# Patient Record
Sex: MALE | Race: WHITE | NOT HISPANIC OR LATINO
[De-identification: names, ages, dates, MRNs, and addresses within clinical notes are randomized per-mention and may not be internally consistent; named-entity substitution may affect disease eponyms.]

---

## 2017-05-08 PROBLEM — Z00.00 ENCOUNTER FOR PREVENTIVE HEALTH EXAMINATION: Status: ACTIVE | Noted: 2017-05-08

## 2017-06-06 ENCOUNTER — APPOINTMENT (OUTPATIENT)
Dept: INTERNAL MEDICINE | Facility: CLINIC | Age: 57
End: 2017-06-06

## 2017-06-06 ENCOUNTER — NON-APPOINTMENT (OUTPATIENT)
Age: 57
End: 2017-06-06

## 2017-06-06 VITALS
OXYGEN SATURATION: 97 % | BODY MASS INDEX: 36.54 KG/M2 | RESPIRATION RATE: 16 BRPM | HEART RATE: 66 BPM | TEMPERATURE: 98.2 F | HEIGHT: 71 IN | WEIGHT: 261.01 LBS | DIASTOLIC BLOOD PRESSURE: 66 MMHG | SYSTOLIC BLOOD PRESSURE: 108 MMHG

## 2017-06-06 RX ORDER — SERTRALINE HYDROCHLORIDE 100 MG/1
100 TABLET, FILM COATED ORAL
Qty: 180 | Refills: 0 | Status: ACTIVE | COMMUNITY
Start: 2017-01-06

## 2017-06-06 RX ORDER — TESTOSTERONE 30 MG/1.5ML
30 SOLUTION TOPICAL
Qty: 540 | Refills: 0 | Status: ACTIVE | COMMUNITY
Start: 2016-12-26

## 2017-06-06 RX ORDER — BUSPIRONE HYDROCHLORIDE 15 MG/1
15 TABLET ORAL
Qty: 270 | Refills: 0 | Status: ACTIVE | COMMUNITY
Start: 2017-03-08

## 2017-06-06 RX ORDER — CLONAZEPAM 2 MG/1
2 TABLET ORAL
Qty: 120 | Refills: 0 | Status: ACTIVE | COMMUNITY
Start: 2016-12-29

## 2017-06-06 RX ORDER — OMEGA-3-ACID ETHYL ESTERS CAPSULES 1 G/1
1 CAPSULE, LIQUID FILLED ORAL
Qty: 360 | Refills: 0 | Status: ACTIVE | COMMUNITY
Start: 2017-03-08

## 2017-11-22 ENCOUNTER — RX RENEWAL (OUTPATIENT)
Age: 57
End: 2017-11-22

## 2017-12-06 ENCOUNTER — APPOINTMENT (OUTPATIENT)
Dept: INTERNAL MEDICINE | Facility: CLINIC | Age: 57
End: 2017-12-06

## 2017-12-07 ENCOUNTER — APPOINTMENT (OUTPATIENT)
Dept: INTERNAL MEDICINE | Facility: CLINIC | Age: 57
End: 2017-12-07
Payer: COMMERCIAL

## 2017-12-07 VITALS
OXYGEN SATURATION: 98 % | TEMPERATURE: 97.6 F | SYSTOLIC BLOOD PRESSURE: 110 MMHG | HEART RATE: 66 BPM | BODY MASS INDEX: 36.68 KG/M2 | RESPIRATION RATE: 16 BRPM | DIASTOLIC BLOOD PRESSURE: 64 MMHG | HEIGHT: 71 IN | WEIGHT: 262 LBS

## 2017-12-07 PROCEDURE — 94060 EVALUATION OF WHEEZING: CPT

## 2017-12-07 PROCEDURE — 94727 GAS DIL/WSHOT DETER LNG VOL: CPT

## 2017-12-07 PROCEDURE — 94729 DIFFUSING CAPACITY: CPT

## 2017-12-07 PROCEDURE — 99214 OFFICE O/P EST MOD 30 MIN: CPT | Mod: 25

## 2017-12-07 RX ORDER — BLOOD SUGAR DIAGNOSTIC
STRIP MISCELLANEOUS
Qty: 100 | Refills: 0 | Status: ACTIVE | COMMUNITY
Start: 2017-11-15

## 2017-12-07 RX ORDER — LANCETS 33 GAUGE
EACH MISCELLANEOUS
Qty: 100 | Refills: 0 | Status: ACTIVE | COMMUNITY
Start: 2017-11-15

## 2017-12-07 RX ORDER — BLOOD-GLUCOSE METER
W/DEVICE KIT MISCELLANEOUS
Qty: 1 | Refills: 0 | Status: ACTIVE | COMMUNITY
Start: 2017-11-15

## 2017-12-26 ENCOUNTER — RX RENEWAL (OUTPATIENT)
Age: 57
End: 2017-12-26

## 2018-01-22 ENCOUNTER — RX RENEWAL (OUTPATIENT)
Age: 58
End: 2018-01-22

## 2018-03-30 ENCOUNTER — RX RENEWAL (OUTPATIENT)
Age: 58
End: 2018-03-30

## 2018-04-24 ENCOUNTER — MEDICATION RENEWAL (OUTPATIENT)
Age: 58
End: 2018-04-24

## 2018-06-27 ENCOUNTER — APPOINTMENT (OUTPATIENT)
Dept: INTERNAL MEDICINE | Facility: CLINIC | Age: 58
End: 2018-06-27
Payer: COMMERCIAL

## 2018-06-27 VITALS
RESPIRATION RATE: 20 BRPM | OXYGEN SATURATION: 99 % | HEART RATE: 60 BPM | WEIGHT: 263 LBS | DIASTOLIC BLOOD PRESSURE: 68 MMHG | HEIGHT: 71 IN | TEMPERATURE: 97.4 F | SYSTOLIC BLOOD PRESSURE: 128 MMHG | BODY MASS INDEX: 36.82 KG/M2

## 2018-06-27 PROCEDURE — 99214 OFFICE O/P EST MOD 30 MIN: CPT | Mod: 25

## 2018-06-27 PROCEDURE — 94060 EVALUATION OF WHEEZING: CPT

## 2018-09-06 ENCOUNTER — MEDICATION RENEWAL (OUTPATIENT)
Age: 58
End: 2018-09-06

## 2019-01-22 ENCOUNTER — RESULT REVIEW (OUTPATIENT)
Age: 59
End: 2019-01-22

## 2019-02-06 ENCOUNTER — APPOINTMENT (OUTPATIENT)
Dept: INTERNAL MEDICINE | Facility: CLINIC | Age: 59
End: 2019-02-06
Payer: COMMERCIAL

## 2019-02-06 VITALS
BODY MASS INDEX: 36.96 KG/M2 | DIASTOLIC BLOOD PRESSURE: 82 MMHG | WEIGHT: 264 LBS | TEMPERATURE: 98.1 F | OXYGEN SATURATION: 97 % | SYSTOLIC BLOOD PRESSURE: 124 MMHG | HEART RATE: 72 BPM | RESPIRATION RATE: 18 BRPM | HEIGHT: 71 IN

## 2019-02-06 DIAGNOSIS — Z82.3 FAMILY HISTORY OF STROKE: ICD-10-CM

## 2019-02-06 DIAGNOSIS — Z82.0 FAMILY HISTORY OF EPILEPSY AND OTHER DISEASES OF THE NERVOUS SYSTEM: ICD-10-CM

## 2019-02-06 DIAGNOSIS — Z80.1 FAMILY HISTORY OF MALIGNANT NEOPLASM OF TRACHEA, BRONCHUS AND LUNG: ICD-10-CM

## 2019-02-06 DIAGNOSIS — Z72.3 LACK OF PHYSICAL EXERCISE: ICD-10-CM

## 2019-02-06 PROCEDURE — 94729 DIFFUSING CAPACITY: CPT

## 2019-02-06 PROCEDURE — ZZZZZ: CPT

## 2019-02-06 PROCEDURE — 99214 OFFICE O/P EST MOD 30 MIN: CPT | Mod: 25

## 2019-02-06 PROCEDURE — 94727 GAS DIL/WSHOT DETER LNG VOL: CPT

## 2019-02-06 PROCEDURE — 94060 EVALUATION OF WHEEZING: CPT

## 2019-02-06 RX ORDER — NAPROXEN 500 MG/1
500 TABLET ORAL
Qty: 60 | Refills: 0 | Status: DISCONTINUED | COMMUNITY
Start: 2017-06-24 | End: 2019-02-06

## 2019-02-06 RX ORDER — PRASUGREL HYDROCHLORIDE 10 MG/1
10 TABLET, COATED ORAL
Qty: 90 | Refills: 0 | Status: DISCONTINUED | COMMUNITY
Start: 2016-12-21 | End: 2019-02-06

## 2019-02-06 RX ORDER — FLUTICASONE PROPIONATE 50 UG/1
50 SPRAY, METERED NASAL
Qty: 16 | Refills: 0 | Status: DISCONTINUED | COMMUNITY
Start: 2016-12-29 | End: 2019-02-06

## 2019-02-06 NOTE — PHYSICAL EXAM
[General Appearance - Well Developed] : well developed [Normal Appearance] : normal appearance [Well Groomed] : well groomed [General Appearance - Well Nourished] : well nourished [No Deformities] : no deformities [General Appearance - In No Acute Distress] : no acute distress [Normal Conjunctiva] : the conjunctiva exhibited no abnormalities [Eyelids - No Xanthelasma] : the eyelids demonstrated no xanthelasmas [Normal Oropharynx] : normal oropharynx [Neck Appearance] : the appearance of the neck was normal [Neck Cervical Mass (___cm)] : no neck mass was observed [Jugular Venous Distention Increased] : there was no jugular-venous distention [Thyroid Diffuse Enlargement] : the thyroid was not enlarged [Thyroid Nodule] : there were no palpable thyroid nodules [Heart Rate And Rhythm] : heart rate and rhythm were normal [Heart Sounds] : normal S1 and S2 [Murmurs] : no murmurs present [Respiration, Rhythm And Depth] : normal respiratory rhythm and effort [Exaggerated Use Of Accessory Muscles For Inspiration] : no accessory muscle use [Auscultation Breath Sounds / Voice Sounds] : lungs were clear to auscultation bilaterally [Abnormal Walk] : normal gait [Gait - Sufficient For Exercise Testing] : the gait was sufficient for exercise testing [Nail Clubbing] : no clubbing of the fingernails [Cyanosis, Localized] : no localized cyanosis [Petechial Hemorrhages (___cm)] : no petechial hemorrhages [Skin Color & Pigmentation] : normal skin color and pigmentation [] : no rash [No Venous Stasis] : no venous stasis [Skin Lesions] : no skin lesions [No Skin Ulcers] : no skin ulcer [No Xanthoma] : no  xanthoma was observed [Deep Tendon Reflexes (DTR)] : deep tendon reflexes were 2+ and symmetric [Sensation] : the sensory exam was normal to light touch and pinprick [No Focal Deficits] : no focal deficits [Oriented To Time, Place, And Person] : oriented to person, place, and time [Impaired Insight] : insight and judgment were intact [Affect] : the affect was normal

## 2019-02-06 NOTE — ASSESSMENT
[FreeTextEntry1] : Len presents for a followup evaluation. Patient has a history of obstructive sleep apnea and narcolepsy. He will continue on current CPAP as well as Provigil. He does not require metered-dose inhaler therapy. We'll followup in 6 months.

## 2019-02-06 NOTE — HISTORY OF PRESENT ILLNESS
[FreeTextEntry1] : Len presents for a followup evaluation. He is feeling well. He continues on CPAP. Patient has a history of obstructive sleep apnea as well as narcolepsy. He continues on Provigil 300 mg daily. Imdur does get some shortness of breath with exertion.

## 2019-03-04 ENCOUNTER — RESULT REVIEW (OUTPATIENT)
Age: 59
End: 2019-03-04

## 2019-07-31 ENCOUNTER — RX RENEWAL (OUTPATIENT)
Age: 59
End: 2019-07-31

## 2019-08-02 ENCOUNTER — NON-APPOINTMENT (OUTPATIENT)
Age: 59
End: 2019-08-02

## 2019-08-02 ENCOUNTER — TRANSCRIPTION ENCOUNTER (OUTPATIENT)
Age: 59
End: 2019-08-02

## 2019-08-02 ENCOUNTER — APPOINTMENT (OUTPATIENT)
Dept: INTERNAL MEDICINE | Facility: CLINIC | Age: 59
End: 2019-08-02
Payer: COMMERCIAL

## 2019-08-02 VITALS
HEIGHT: 71 IN | DIASTOLIC BLOOD PRESSURE: 66 MMHG | HEART RATE: 74 BPM | TEMPERATURE: 98.3 F | SYSTOLIC BLOOD PRESSURE: 134 MMHG | WEIGHT: 248 LBS | OXYGEN SATURATION: 98 % | BODY MASS INDEX: 34.72 KG/M2 | RESPIRATION RATE: 16 BRPM

## 2019-08-02 PROCEDURE — 99214 OFFICE O/P EST MOD 30 MIN: CPT | Mod: 25

## 2019-08-02 PROCEDURE — 94060 EVALUATION OF WHEEZING: CPT

## 2019-08-02 NOTE — DISCUSSION/SUMMARY
[FreeTextEntry1] : Len presents for followup evaluation. He will continue on his CPAP as well as Provigil for narcolepsy. He denies any significant shortness of breath. Medications have been reviewed. He will followup in 6 months.

## 2019-08-02 NOTE — HISTORY OF PRESENT ILLNESS
[FreeTextEntry1] : Mr. Cano presents for followup evaluation. He has a history of obstructive sleep apnea as well as narcolepsy. He denies any chest pain, shortness of breath palpitations. No nocturnal symptoms of cough or dyspnea. He continues on Provigil therapy as well as his CPAP.

## 2019-08-02 NOTE — PHYSICAL EXAM
[General Appearance - Well Developed] : well developed [Normal Appearance] : normal appearance [General Appearance - Well Nourished] : well nourished [Well Groomed] : well groomed [No Deformities] : no deformities [General Appearance - In No Acute Distress] : no acute distress [Normal Conjunctiva] : the conjunctiva exhibited no abnormalities [Eyelids - No Xanthelasma] : the eyelids demonstrated no xanthelasmas [Normal Oropharynx] : normal oropharynx [Neck Appearance] : the appearance of the neck was normal [Jugular Venous Distention Increased] : there was no jugular-venous distention [Neck Cervical Mass (___cm)] : no neck mass was observed [Thyroid Diffuse Enlargement] : the thyroid was not enlarged [Thyroid Nodule] : there were no palpable thyroid nodules [Heart Sounds] : normal S1 and S2 [Heart Rate And Rhythm] : heart rate and rhythm were normal [Murmurs] : no murmurs present [Respiration, Rhythm And Depth] : normal respiratory rhythm and effort [Exaggerated Use Of Accessory Muscles For Inspiration] : no accessory muscle use [Auscultation Breath Sounds / Voice Sounds] : lungs were clear to auscultation bilaterally [Abdomen Soft] : soft [Abdomen Tenderness] : non-tender [Abdomen Mass (___ Cm)] : no abdominal mass palpated [Abnormal Walk] : normal gait [Gait - Sufficient For Exercise Testing] : the gait was sufficient for exercise testing [Nail Clubbing] : no clubbing of the fingernails [Petechial Hemorrhages (___cm)] : no petechial hemorrhages [Cyanosis, Localized] : no localized cyanosis [Skin Color & Pigmentation] : normal skin color and pigmentation [] : no rash [No Venous Stasis] : no venous stasis [Skin Lesions] : no skin lesions [No Xanthoma] : no  xanthoma was observed [No Skin Ulcers] : no skin ulcer [Deep Tendon Reflexes (DTR)] : deep tendon reflexes were 2+ and symmetric [Sensation] : the sensory exam was normal to light touch and pinprick [No Focal Deficits] : no focal deficits

## 2019-11-20 ENCOUNTER — APPOINTMENT (OUTPATIENT)
Dept: GASTROENTEROLOGY | Facility: CLINIC | Age: 59
End: 2019-11-20
Payer: COMMERCIAL

## 2019-11-20 VITALS
BODY MASS INDEX: 35.14 KG/M2 | HEART RATE: 73 BPM | HEIGHT: 71 IN | WEIGHT: 251 LBS | DIASTOLIC BLOOD PRESSURE: 67 MMHG | SYSTOLIC BLOOD PRESSURE: 138 MMHG

## 2019-11-20 PROCEDURE — 99213 OFFICE O/P EST LOW 20 MIN: CPT

## 2019-11-20 NOTE — PHYSICAL EXAM
[General Appearance - Alert] : alert [General Appearance - In No Acute Distress] : in no acute distress [Sclera] : the sclera and conjunctiva were normal [PERRL With Normal Accommodation] : pupils were equal in size, round, and reactive to light [Extraocular Movements] : extraocular movements were intact [] : no respiratory distress [Auscultation Breath Sounds / Voice Sounds] : lungs were clear to auscultation bilaterally [Heart Rate And Rhythm] : heart rate was normal and rhythm regular [Heart Sounds] : normal S1 and S2 [Heart Sounds Gallop] : no gallops [Murmurs] : no murmurs [Heart Sounds Pericardial Friction Rub] : no pericardial rub [FreeTextEntry1] : + mild epigastric tenderness.  [Abnormal Walk] : normal gait [Nail Clubbing] : no clubbing  or cyanosis of the fingernails [Musculoskeletal - Swelling] : no joint swelling seen [Motor Tone] : muscle strength and tone were normal [Oriented To Time, Place, And Person] : oriented to person, place, and time [Impaired Insight] : insight and judgment were intact [Affect] : the affect was normal

## 2019-11-20 NOTE — HISTORY OF PRESENT ILLNESS
[de-identified] : 60 y/o male here today with hx of gerd/duodenitis on egd.  He is currently on dexilant and zantac at bedtime and continues to have mild breakthrough symptoms.  Pt reports not wanting to stop zantac but discussed risks of possible cancer? will trial pepcid as needed.  He otherwise feels well.  Denies any abdominal pain just mild tenderness after his trigger foods.  Denies any n/v, rectal bleeding, or melena.

## 2019-11-20 NOTE — ASSESSMENT
[FreeTextEntry1] : 58 y/o male with hx of gerd/duodenitis on dexilant and zantac with mild breakthrough symptoms.  Instructed to stop zantac now due to risks of cancer and will start pepcid.  Discussed the importance of medication adherence and diet in length.  Discussed weight loss.  If symptoms worsen, will repeat EGD.  Discussed with Dr. Farooq.

## 2020-02-04 ENCOUNTER — APPOINTMENT (OUTPATIENT)
Dept: INTERNAL MEDICINE | Facility: CLINIC | Age: 60
End: 2020-02-04
Payer: COMMERCIAL

## 2020-02-04 VITALS
WEIGHT: 256 LBS | RESPIRATION RATE: 18 BRPM | DIASTOLIC BLOOD PRESSURE: 78 MMHG | OXYGEN SATURATION: 96 % | HEART RATE: 72 BPM | BODY MASS INDEX: 35.84 KG/M2 | SYSTOLIC BLOOD PRESSURE: 136 MMHG | TEMPERATURE: 97.8 F | HEIGHT: 71 IN

## 2020-02-04 PROCEDURE — 99214 OFFICE O/P EST MOD 30 MIN: CPT

## 2020-02-04 NOTE — ASSESSMENT
[FreeTextEntry1] : Mr. Cano presents for followup evaluation. Patient will be sent for a repeat CPAP titration study. He is having increased symptoms of daytime tiredness and is been several years since his last CPAP titration. He will continue on modafinil 100 mg t.i.d. Followup in 6 months.

## 2020-02-04 NOTE — PHYSICAL EXAM
[No Acute Distress] : no acute distress [Normal Oropharynx] : normal oropharynx [Normal Appearance] : normal appearance [No Neck Mass] : no neck mass [Normal Rate/Rhythm] : normal rate/rhythm [Normal S1, S2] : normal s1, s2 [No Resp Distress] : no resp distress [No Murmurs] : no murmurs [Clear to Auscultation Bilaterally] : clear to auscultation bilaterally [No Abnormalities] : no abnormalities [Benign] : benign [Normal Gait] : normal gait [No Clubbing] : no clubbing [No Cyanosis] : no cyanosis [No Edema] : no edema [FROM] : FROM [Normal Color/ Pigmentation] : normal color/ pigmentation [No Focal Deficits] : no focal deficits [Oriented x3] : oriented x3 [Normal Affect] : normal affect

## 2020-02-04 NOTE — HISTORY OF PRESENT ILLNESS
[TextBox_4] : Mr. Vasquez presents for a followup evaluation. As a history of obstructive sleep apnea and is currently on BiPAP 18/14 cm of pressure. Patient also has history of narcolepsy and is on Modanifil 100 mg t.i.d. Patient is having issues with a mask leak. He is having some daytime tiredness.

## 2020-02-18 ENCOUNTER — RX RENEWAL (OUTPATIENT)
Age: 60
End: 2020-02-18

## 2020-02-19 ENCOUNTER — APPOINTMENT (OUTPATIENT)
Dept: GASTROENTEROLOGY | Facility: CLINIC | Age: 60
End: 2020-02-19
Payer: COMMERCIAL

## 2020-02-19 VITALS
SYSTOLIC BLOOD PRESSURE: 143 MMHG | DIASTOLIC BLOOD PRESSURE: 78 MMHG | BODY MASS INDEX: 36.26 KG/M2 | HEART RATE: 70 BPM | HEIGHT: 71 IN | WEIGHT: 259 LBS

## 2020-02-19 PROCEDURE — 99213 OFFICE O/P EST LOW 20 MIN: CPT

## 2020-02-19 NOTE — HISTORY OF PRESENT ILLNESS
[de-identified] : Mr. DERRICK IRBY is a 59 year old male with history of atypical reflux symptoms and chest discomfort. Patient has had normal endoscopy and has had a trial of multiple PPIs including Dexilant. There is no true dysphagia. Patient is careful about diet. Issues about possible nonacid reflux and esophageal spasm discussed at length with the patient.\par

## 2020-02-19 NOTE — ASSESSMENT
[FreeTextEntry1] : 60 yo male with history of atypical GERD symptoms without response to PPI and H2 blockers. Will refer patient for EM and 24 hr pH study on medications.

## 2020-03-13 ENCOUNTER — APPOINTMENT (OUTPATIENT)
Dept: INTERNAL MEDICINE | Facility: CLINIC | Age: 60
End: 2020-03-13
Payer: COMMERCIAL

## 2020-03-13 VITALS
RESPIRATION RATE: 18 BRPM | HEART RATE: 85 BPM | TEMPERATURE: 97.8 F | SYSTOLIC BLOOD PRESSURE: 134 MMHG | DIASTOLIC BLOOD PRESSURE: 70 MMHG | BODY MASS INDEX: 37.24 KG/M2 | HEIGHT: 71 IN | WEIGHT: 266 LBS | OXYGEN SATURATION: 97 %

## 2020-03-13 PROCEDURE — 94727 GAS DIL/WSHOT DETER LNG VOL: CPT

## 2020-03-13 PROCEDURE — 94729 DIFFUSING CAPACITY: CPT

## 2020-03-13 PROCEDURE — 99214 OFFICE O/P EST MOD 30 MIN: CPT | Mod: 25

## 2020-03-13 PROCEDURE — ZZZZZ: CPT

## 2020-03-13 PROCEDURE — 94060 EVALUATION OF WHEEZING: CPT

## 2020-03-13 NOTE — HISTORY OF PRESENT ILLNESS
[TextBox_4] : This is a 60-year-old male with a history of obstructive sleep apnea maintained on BIPAP treatment, CAD, GERD, who fell 2 days ago and sustained a ligament injury to the left wrist. He also has noted some right lower medial rib tenderness. He was seen at [facility] and had a CT scan of chest. This incidentally showed a 1.5 cm groundglass finding in the left lower lobe. \par \par The patient does have some mild cough associated with postnasal drip which has not changed recently. He is not having any sputum production, hemoptysis, wheezing, or dyspnea on exertion.  He denies recent fever, chills, night sweats, poor appetite, or unintentional weight loss. He has no history of cancer, lymphoma, or malignancy.

## 2020-03-13 NOTE — PROCEDURE
[FreeTextEntry1] : For pulmonary function testing is within normal limits with an FEV1 of 3.67 or 99% predicted. Diffusing capacity is normal.

## 2020-03-13 NOTE — REVIEW OF SYSTEMS
[Negative] : Endocrine [Fever] : no fever [Chills] : no chills [Poor Appetite] : no poor appetite [Recent Wt Loss (___ Lbs)] : ~T no recent weight loss [TextBox_30] : see above

## 2020-03-13 NOTE — PHYSICAL EXAM
[No Acute Distress] : no acute distress [Normal Oropharynx] : normal oropharynx [Normal Appearance] : normal appearance [No Neck Mass] : no neck mass [Normal Rate/Rhythm] : normal rate/rhythm [Normal S1, S2] : normal s1, s2 [No Resp Distress] : no resp distress [Clear to Auscultation Bilaterally] : clear to auscultation bilaterally [No Abnormalities] : no abnormalities [Benign] : benign [No Clubbing] : no clubbing [No Cyanosis] : no cyanosis [No Edema] : no edema [Normal Color/ Pigmentation] : normal color/ pigmentation [No Focal Deficits] : no focal deficits [Oriented x3] : oriented x3 [Normal Affect] : normal affect [TextBox_44] : has known R thyroid nodule followed by Dr KEITH Red [TextBox_80] : Positive tender R lower medial rib area.

## 2020-03-13 NOTE — ASSESSMENT
[FreeTextEntry1] : #1 incidental finding of a 1.5 cm groundglass findings left lower lobe seen on CT scan of chest after fall 2 days ago. The patient will bring in the CT disc from [place], for our review and return visit.   #2 obstructive sleep apnea. He is maintained on BiPAP 18/14. Scheduled to have a following CPAP titration study.  #3 CAD, DM, GERD. Continue current medicines.

## 2020-05-04 ENCOUNTER — APPOINTMENT (OUTPATIENT)
Dept: INTERNAL MEDICINE | Facility: CLINIC | Age: 60
End: 2020-05-04

## 2020-05-20 ENCOUNTER — RX RENEWAL (OUTPATIENT)
Age: 60
End: 2020-05-20

## 2020-08-04 ENCOUNTER — APPOINTMENT (OUTPATIENT)
Dept: INTERNAL MEDICINE | Facility: CLINIC | Age: 60
End: 2020-08-04
Payer: COMMERCIAL

## 2020-08-04 VITALS
OXYGEN SATURATION: 96 % | HEIGHT: 71 IN | TEMPERATURE: 98.4 F | SYSTOLIC BLOOD PRESSURE: 114 MMHG | WEIGHT: 266 LBS | DIASTOLIC BLOOD PRESSURE: 72 MMHG | BODY MASS INDEX: 37.24 KG/M2 | RESPIRATION RATE: 18 BRPM | HEART RATE: 64 BPM

## 2020-08-04 PROCEDURE — 99214 OFFICE O/P EST MOD 30 MIN: CPT

## 2020-08-04 NOTE — ASSESSMENT
[FreeTextEntry1] : Mr. Cano presents for followup evaluation. He was continued on current CPAP as well as modafinil therapy. He will schedule and in lab CPAP titration study as previously discussed. Followup in 6 months.

## 2020-08-14 DIAGNOSIS — K21.9 GASTRO-ESOPHAGEAL REFLUX DISEASE W/OUT ESOPHAGITIS: ICD-10-CM

## 2020-11-05 ENCOUNTER — NON-APPOINTMENT (OUTPATIENT)
Age: 60
End: 2020-11-05

## 2020-11-17 ENCOUNTER — RX RENEWAL (OUTPATIENT)
Age: 60
End: 2020-11-17

## 2020-11-17 RX ORDER — FAMOTIDINE 40 MG/1
40 TABLET, FILM COATED ORAL
Qty: 90 | Refills: 0 | Status: ACTIVE | COMMUNITY
Start: 2019-11-20 | End: 1900-01-01

## 2020-12-09 ENCOUNTER — APPOINTMENT (OUTPATIENT)
Dept: CT IMAGING | Facility: CLINIC | Age: 60
End: 2020-12-09
Payer: COMMERCIAL

## 2020-12-09 PROCEDURE — 71250 CT THORAX DX C-: CPT

## 2020-12-21 ENCOUNTER — NON-APPOINTMENT (OUTPATIENT)
Age: 60
End: 2020-12-21

## 2021-02-12 ENCOUNTER — RX RENEWAL (OUTPATIENT)
Age: 61
End: 2021-02-12

## 2021-02-15 ENCOUNTER — RX RENEWAL (OUTPATIENT)
Age: 61
End: 2021-02-15

## 2021-03-05 NOTE — HISTORY OF PRESENT ILLNESS
I put the orders in [TextBox_4] : Mr. Vasquez presents for followup evaluation. Feeling well. Patient has history of obstructive sleep apnea and narcolepsy. He continues on CPAP therapy. Patient scheduled for repeat CPAP titration. He also continues on modafinil 100 mg t.i.d. for narcolepsy and

## 2021-03-24 ENCOUNTER — APPOINTMENT (OUTPATIENT)
Dept: INTERNAL MEDICINE | Facility: CLINIC | Age: 61
End: 2021-03-24
Payer: COMMERCIAL

## 2021-03-24 VITALS
BODY MASS INDEX: 36.4 KG/M2 | OXYGEN SATURATION: 97 % | WEIGHT: 260 LBS | HEART RATE: 76 BPM | RESPIRATION RATE: 18 BRPM | SYSTOLIC BLOOD PRESSURE: 128 MMHG | HEIGHT: 71 IN | DIASTOLIC BLOOD PRESSURE: 70 MMHG | TEMPERATURE: 98.1 F

## 2021-03-24 DIAGNOSIS — I25.10 ATHEROSCLEROTIC HEART DISEASE OF NATIVE CORONARY ARTERY W/OUT ANGINA PECTORIS: ICD-10-CM

## 2021-03-24 DIAGNOSIS — R93.89 ABNORMAL FINDINGS ON DIAGNOSTIC IMAGING OF OTHER SPECIFIED BODY STRUCTURES: ICD-10-CM

## 2021-03-24 DIAGNOSIS — I10 ESSENTIAL (PRIMARY) HYPERTENSION: ICD-10-CM

## 2021-03-24 PROCEDURE — 99214 OFFICE O/P EST MOD 30 MIN: CPT

## 2021-03-24 PROCEDURE — 99072 ADDL SUPL MATRL&STAF TM PHE: CPT

## 2021-03-24 NOTE — HISTORY OF PRESENT ILLNESS
[TextBox_4] : Mr. Vasquez presents for followup evaluation. As a history of obstructive sleep apnea as well as oncology. He is currently on CPAP. Patient states his blood pressure has been elevated in the mornings. He has been to his cardiologist. The patient also has a history of narcolepsy and takes modenifil.

## 2021-03-24 NOTE — DISCUSSION/SUMMARY
[FreeTextEntry1] : Len presents for followup evaluation. He has a history of obstructive sleep apnea and is on CPAP therapy. Also has narcolepsy. He will continue on current CPAP. The patient will be scheduled for a split night polysomnography examination to assess degree of obstructive sleep apnea and CPAP requirements. He is complaining of worsening tiredness during the day and his blood pressure has become labile. There is association between labile hypertension and obstructive sleep apnea. He will also continue on modafinil 4 his hypersomnia. Followup in 3 months.

## 2021-04-06 ENCOUNTER — OUTPATIENT (OUTPATIENT)
Dept: OUTPATIENT SERVICES | Facility: HOSPITAL | Age: 61
LOS: 1 days | End: 2021-04-06
Payer: COMMERCIAL

## 2021-04-06 DIAGNOSIS — G47.33 OBSTRUCTIVE SLEEP APNEA (ADULT) (PEDIATRIC): ICD-10-CM

## 2021-04-06 PROCEDURE — 95811 POLYSOM 6/>YRS CPAP 4/> PARM: CPT

## 2021-05-18 ENCOUNTER — NON-APPOINTMENT (OUTPATIENT)
Age: 61
End: 2021-05-18

## 2021-07-22 ENCOUNTER — APPOINTMENT (OUTPATIENT)
Dept: INTERNAL MEDICINE | Facility: CLINIC | Age: 61
End: 2021-07-22

## 2021-09-10 ENCOUNTER — APPOINTMENT (OUTPATIENT)
Dept: INTERNAL MEDICINE | Facility: CLINIC | Age: 61
End: 2021-09-10
Payer: COMMERCIAL

## 2021-09-10 ENCOUNTER — APPOINTMENT (OUTPATIENT)
Dept: INTERNAL MEDICINE | Facility: CLINIC | Age: 61
End: 2021-09-10

## 2021-09-10 VITALS
TEMPERATURE: 98.2 F | RESPIRATION RATE: 18 BRPM | SYSTOLIC BLOOD PRESSURE: 120 MMHG | WEIGHT: 256 LBS | DIASTOLIC BLOOD PRESSURE: 70 MMHG | OXYGEN SATURATION: 97 % | HEART RATE: 74 BPM | BODY MASS INDEX: 35.84 KG/M2 | HEIGHT: 71 IN

## 2021-09-10 PROCEDURE — 99214 OFFICE O/P EST MOD 30 MIN: CPT

## 2021-09-10 RX ORDER — PITAVASTATIN CALCIUM 2.09 MG/1
2 TABLET, FILM COATED ORAL
Qty: 60 | Refills: 0 | Status: DISCONTINUED | COMMUNITY
Start: 2017-01-11 | End: 2021-09-10

## 2022-01-01 NOTE — PHYSICAL EXAM
[No Acute Distress] : no acute distress [Normal Oropharynx] : normal oropharynx [Normal Appearance] : normal appearance [No Neck Mass] : no neck mass [Normal S1, S2] : normal s1, s2 [Normal Rate/Rhythm] : normal rate/rhythm [No Murmurs] : no murmurs [No Resp Distress] : no resp distress [Benign] : benign [No Abnormalities] : no abnormalities [Clear to Auscultation Bilaterally] : clear to auscultation bilaterally [No Clubbing] : no clubbing [Normal Gait] : normal gait [No Edema] : no edema [No Cyanosis] : no cyanosis [FROM] : FROM [Normal Color/ Pigmentation] : normal color/ pigmentation [No Focal Deficits] : no focal deficits [Oriented x3] : oriented x3 [Normal Affect] : normal affect negative

## 2022-03-11 ENCOUNTER — APPOINTMENT (OUTPATIENT)
Dept: INTERNAL MEDICINE | Facility: CLINIC | Age: 62
End: 2022-03-11
Payer: COMMERCIAL

## 2022-03-11 VITALS
HEIGHT: 71 IN | WEIGHT: 246 LBS | BODY MASS INDEX: 34.44 KG/M2 | SYSTOLIC BLOOD PRESSURE: 110 MMHG | HEART RATE: 70 BPM | DIASTOLIC BLOOD PRESSURE: 60 MMHG | TEMPERATURE: 98 F | OXYGEN SATURATION: 97 % | RESPIRATION RATE: 16 BRPM

## 2022-03-11 DIAGNOSIS — E66.9 OBESITY, UNSPECIFIED: ICD-10-CM

## 2022-03-11 PROCEDURE — 99214 OFFICE O/P EST MOD 30 MIN: CPT

## 2022-03-11 RX ORDER — METFORMIN HYDROCHLORIDE 500 MG/1
500 TABLET, COATED ORAL
Qty: 120 | Refills: 0 | Status: DISCONTINUED | COMMUNITY
Start: 2016-07-07 | End: 2022-03-11

## 2022-03-11 RX ORDER — LEVOTHYROXINE SODIUM 0.07 MG/1
75 TABLET ORAL
Refills: 0 | Status: ACTIVE | COMMUNITY

## 2022-03-11 RX ORDER — DAPAGLIFLOZIN AND METFORMIN HYDROCHLORIDE 2.5; 1 MG/1; MG/1
TABLET, FILM COATED, EXTENDED RELEASE ORAL
Refills: 0 | Status: ACTIVE | COMMUNITY

## 2022-03-11 NOTE — DISCUSSION/SUMMARY
[FreeTextEntry1] : Len presents for a followup evaluation. As a history of obstructive sleep apnea. He will continue on current BiPAP 20/4 cm H2O. Patient will also continue on modafinil 300 mg daily for treatment of his narcolepsy. Followup in 6 months

## 2022-03-11 NOTE — HISTORY OF PRESENT ILLNESS
[TextBox_4] : Len presents for followup evaluation. He has a history of severe obstructive sleep apnea with narcolepsy. He is currently on BiPAP at 19/4 cm H2O. His most recent titration study had recommended a BiPAP of 25/13 cm H2O however this pressure was too high. Complains status shows the Len is compliant with his CPAP use and does feel significantly improved since starting CPAP therapy. He also takes Provigil during the day for treatment of his narcolepsy.

## 2022-03-11 NOTE — DISCUSSION/SUMMARY
[FreeTextEntry1] : Len presents for followup evaluation. He has a history of significant obstructive sleep apnea and narcolepsy. He is currently on BiPAP. BiPAP settings have been changed to auto BiPAP 20/4 with PSV 4. patient will continue on Provigil 300 mg daily. He's been given a prescription for a  new mask. Followup in 6 months.

## 2022-03-11 NOTE — HISTORY OF PRESENT ILLNESS
[TextBox_4] : Len presents for a followup evaluation. As a history of significant obstructive sleep apnea as well as narcolepsy. He is on BiPAP 20/4 cm H2O. His sleep apnea significantly improved at the current settings. Patient also continues on Provigil for treatment of his narcolepsy during the day.

## 2022-09-09 ENCOUNTER — APPOINTMENT (OUTPATIENT)
Dept: INTERNAL MEDICINE | Facility: CLINIC | Age: 62
End: 2022-09-09

## 2022-09-09 VITALS
BODY MASS INDEX: 30.94 KG/M2 | DIASTOLIC BLOOD PRESSURE: 62 MMHG | OXYGEN SATURATION: 98 % | WEIGHT: 221 LBS | TEMPERATURE: 98.4 F | SYSTOLIC BLOOD PRESSURE: 112 MMHG | HEIGHT: 71 IN | RESPIRATION RATE: 16 BRPM | HEART RATE: 72 BPM

## 2022-09-09 PROCEDURE — 99214 OFFICE O/P EST MOD 30 MIN: CPT

## 2022-09-09 RX ORDER — DILTIAZEM HYDROCHLORIDE 180 MG/1
180 CAPSULE, COATED, EXTENDED RELEASE ORAL
Refills: 0 | Status: DISCONTINUED | COMMUNITY
Start: 2016-05-16 | End: 2022-09-09

## 2022-09-09 RX ORDER — SITAGLIPTIN 100 MG/1
100 TABLET, FILM COATED ORAL
Qty: 90 | Refills: 0 | Status: DISCONTINUED | COMMUNITY
Start: 2019-07-30 | End: 2022-09-09

## 2022-09-09 RX ORDER — BENAZEPRIL HYDROCHLORIDE AND HYDROCHLOROTHIAZIDE 10; 12.5 MG/1; MG/1
10-12.5 TABLET, FILM COATED ORAL DAILY
Refills: 0 | Status: DISCONTINUED | COMMUNITY
Start: 2016-11-11 | End: 2022-09-09

## 2022-09-09 NOTE — HISTORY OF PRESENT ILLNESS
[TextBox_4] : Mr. Cano presents for a follow-up evaluation.  He has a history of obstructive sleep apnea with narcolepsy.  He is currently on CPAP therapy and takes modafinil 300 mg daily.  Patient has lost approximately 30 pounds in the past 6 months.  He denies any chest pain, shortness of breath or palpitations.  He has no nocturnal symptoms of cough or dyspnea.  He states that in the past several weeks he has gone several nights without using his CPAP mask and states that he had a very restful night sleep and did not feel tired during the day.

## 2022-09-09 NOTE — DISCUSSION/SUMMARY
[FreeTextEntry1] : Mr. Cano presents for a follow-up evaluation.\par \par 1.  Patient has a history of obstructive sleep apnea.  He has lost approximately 30 pounds.  He states that he has tried several nights without using the mask and is felt well rested the next day.  Patient will have a home polysomnography examination to determine whether he still has significant obstructive sleep apnea.  He will continue on modafinil therapy at present.  He may require a multiple sleep latency test at some point.  Follow-up in 6 months.

## 2022-10-03 ENCOUNTER — RX RENEWAL (OUTPATIENT)
Age: 62
End: 2022-10-03

## 2022-11-03 ENCOUNTER — OUTPATIENT (OUTPATIENT)
Dept: OUTPATIENT SERVICES | Facility: HOSPITAL | Age: 62
LOS: 1 days | End: 2022-11-03
Payer: COMMERCIAL

## 2022-11-03 DIAGNOSIS — G47.33 OBSTRUCTIVE SLEEP APNEA (ADULT) (PEDIATRIC): ICD-10-CM

## 2022-11-03 PROCEDURE — 95800 SLP STDY UNATTENDED: CPT

## 2023-01-26 ENCOUNTER — APPOINTMENT (OUTPATIENT)
Dept: GASTROENTEROLOGY | Facility: CLINIC | Age: 63
End: 2023-01-26
Payer: COMMERCIAL

## 2023-01-26 VITALS
WEIGHT: 221 LBS | BODY MASS INDEX: 30.94 KG/M2 | HEART RATE: 69 BPM | SYSTOLIC BLOOD PRESSURE: 134 MMHG | DIASTOLIC BLOOD PRESSURE: 76 MMHG | HEIGHT: 71 IN

## 2023-01-26 DIAGNOSIS — Z12.11 ENCOUNTER FOR SCREENING FOR MALIGNANT NEOPLASM OF COLON: ICD-10-CM

## 2023-01-26 PROCEDURE — 99203 OFFICE O/P NEW LOW 30 MIN: CPT

## 2023-01-26 NOTE — ASSESSMENT
[FreeTextEntry1] : Plan:\par Recommend colonoscopy for screening purposes. Risks versus benefits as well as instructions reviewed pt agrees to planned procedure. All questions answered, pt expressed understanding. Given instructions to call his PCP to discuss holding diabetic medications prior. Discussed with Dr. Farooq. I have spent 35 minutes on this encounter.

## 2023-01-26 NOTE — HISTORY OF PRESENT ILLNESS
[FreeTextEntry1] : Len rasmussen is a 62 year old male PMH DM type 2 presenting today for initial consult for screening colonoscopy. had last procedure in 2015, was recommended to repeat in 3 years because of +FMH of CRC in his father. pt does admit recently he has been struggling with constipation, but notes when he drinks enough water his symptoms are controlled. Denies overt bleeding such as melena or hematochezia. His GERD symptoms are currently well controlled taking 40MG pantoprazole daily. Denies breakthrough nausea, vomiting, epigastric pain, or dysphagia.

## 2023-03-20 ENCOUNTER — APPOINTMENT (OUTPATIENT)
Dept: INTERNAL MEDICINE | Facility: CLINIC | Age: 63
End: 2023-03-20
Payer: COMMERCIAL

## 2023-03-20 VITALS
DIASTOLIC BLOOD PRESSURE: 62 MMHG | OXYGEN SATURATION: 97 % | SYSTOLIC BLOOD PRESSURE: 130 MMHG | HEIGHT: 71 IN | WEIGHT: 220 LBS | HEART RATE: 70 BPM | BODY MASS INDEX: 30.8 KG/M2 | TEMPERATURE: 97.8 F

## 2023-03-20 DIAGNOSIS — R06.00 DYSPNEA, UNSPECIFIED: ICD-10-CM

## 2023-03-20 PROCEDURE — 99214 OFFICE O/P EST MOD 30 MIN: CPT

## 2023-03-20 RX ORDER — DEXLANSOPRAZOLE 60 MG/1
60 CAPSULE, DELAYED RELEASE ORAL DAILY
Qty: 90 | Refills: 0 | Status: DISCONTINUED | COMMUNITY
Start: 2019-07-31 | End: 2023-03-20

## 2023-03-20 RX ORDER — PANTOPRAZOLE 40 MG/1
40 TABLET, DELAYED RELEASE ORAL DAILY
Qty: 90 | Refills: 3 | Status: DISCONTINUED | COMMUNITY
Start: 2020-08-14 | End: 2023-03-20

## 2023-03-20 RX ORDER — SODIUM SULFATE, POTASSIUM SULFATE AND MAGNESIUM SULFATE 1.6; 3.13; 17.5 G/177ML; G/177ML; G/177ML
17.5-3.13-1.6 SOLUTION ORAL
Qty: 1 | Refills: 0 | Status: DISCONTINUED | COMMUNITY
Start: 2023-01-26 | End: 2023-03-20

## 2023-03-20 NOTE — REASON FOR VISIT
[Follow-Up - From Hospitalization] : a follow-up visit after a recent hospitalization [Sleep Apnea] : sleep apnea [TextBox_44] : Narcolepsy

## 2023-03-20 NOTE — DISCUSSION/SUMMARY
[FreeTextEntry1] : Mr. Cano presents for follow-up evaluation.\par He will continue on CPAP and went down to fill for his narcolepsy.\par \par Follow-up in 6 months.

## 2023-03-20 NOTE — HISTORY OF PRESENT ILLNESS
[TextBox_4] : Mr. Cano presents for follow-up evaluation.\par He is feeling well.\par Patient continues on AutoPap therapy for obstructive sleep apnea.\par He also has a history of narcolepsy and takes modafinil.\par Patient has lost approximately 45 pounds through dieting.

## 2023-05-16 ENCOUNTER — RX RENEWAL (OUTPATIENT)
Age: 63
End: 2023-05-16

## 2023-05-16 LAB — SARS-COV-2 N GENE NPH QL NAA+PROBE: NOT DETECTED

## 2023-06-19 ENCOUNTER — APPOINTMENT (OUTPATIENT)
Dept: GASTROENTEROLOGY | Facility: AMBULATORY MEDICAL SERVICES | Age: 63
End: 2023-06-19
Payer: MEDICARE

## 2023-06-19 ENCOUNTER — RESULT REVIEW (OUTPATIENT)
Age: 63
End: 2023-06-19

## 2023-06-19 PROCEDURE — 45385 COLONOSCOPY W/LESION REMOVAL: CPT | Mod: 59

## 2023-06-19 PROCEDURE — 45380 COLONOSCOPY AND BIOPSY: CPT

## 2023-12-28 RX ORDER — MODAFINIL 100 MG/1
100 TABLET ORAL DAILY
Qty: 90 | Refills: 5 | Status: ACTIVE | COMMUNITY
Start: 2017-12-26 | End: 1900-01-01

## 2024-01-11 ENCOUNTER — APPOINTMENT (OUTPATIENT)
Dept: INTERNAL MEDICINE | Facility: CLINIC | Age: 64
End: 2024-01-11

## 2024-03-06 ENCOUNTER — APPOINTMENT (OUTPATIENT)
Dept: GASTROENTEROLOGY | Facility: CLINIC | Age: 64
End: 2024-03-06

## 2024-03-20 ENCOUNTER — APPOINTMENT (OUTPATIENT)
Dept: INTERNAL MEDICINE | Facility: CLINIC | Age: 64
End: 2024-03-20
Payer: MEDICARE

## 2024-03-20 VITALS
SYSTOLIC BLOOD PRESSURE: 118 MMHG | DIASTOLIC BLOOD PRESSURE: 70 MMHG | BODY MASS INDEX: 30.94 KG/M2 | WEIGHT: 221 LBS | OXYGEN SATURATION: 97 % | TEMPERATURE: 98.6 F | RESPIRATION RATE: 16 BRPM | HEART RATE: 76 BPM | HEIGHT: 71 IN

## 2024-03-20 DIAGNOSIS — Z98.890 OTHER SPECIFIED POSTPROCEDURAL STATES: ICD-10-CM

## 2024-03-20 DIAGNOSIS — G47.419 NARCOLEPSY W/OUT CATAPLEXY: ICD-10-CM

## 2024-03-20 DIAGNOSIS — I97.0 POSTCARDIOTOMY SYNDROME: ICD-10-CM

## 2024-03-20 DIAGNOSIS — Z95.1 PRESENCE OF AORTOCORONARY BYPASS GRAFT: ICD-10-CM

## 2024-03-20 DIAGNOSIS — G47.33 OBSTRUCTIVE SLEEP APNEA (ADULT) (PEDIATRIC): ICD-10-CM

## 2024-03-20 PROCEDURE — 99214 OFFICE O/P EST MOD 30 MIN: CPT

## 2024-03-20 RX ORDER — RIVAROXABAN 2.5 MG/1
2.5 TABLET, FILM COATED ORAL TWICE DAILY
Qty: 60 | Refills: 0 | Status: DISCONTINUED | COMMUNITY
Start: 2019-02-06 | End: 2024-03-20

## 2024-03-20 RX ORDER — METOPROLOL SUCCINATE 25 MG/1
25 TABLET, EXTENDED RELEASE ORAL DAILY
Refills: 0 | Status: ACTIVE | COMMUNITY

## 2024-03-20 RX ORDER — PANTOPRAZOLE SODIUM 40 MG/1
40 TABLET, DELAYED RELEASE ORAL TWICE DAILY
Refills: 0 | Status: ACTIVE | COMMUNITY

## 2024-03-20 RX ORDER — IPRATROPIUM BROMIDE 0.5 MG/2.5ML
0.02 SOLUTION RESPIRATORY (INHALATION) 4 TIMES DAILY
Refills: 0 | Status: ACTIVE | COMMUNITY

## 2024-03-20 RX ORDER — CLOPIDOGREL 75 MG/1
75 TABLET, FILM COATED ORAL DAILY
Refills: 0 | Status: ACTIVE | COMMUNITY

## 2024-03-20 RX ORDER — OLMESARTAN MEDOXOMIL 40 MG/1
40 TABLET, FILM COATED ORAL DAILY
Refills: 0 | Status: DISCONTINUED | COMMUNITY
End: 2024-03-20

## 2024-03-20 NOTE — DISCUSSION/SUMMARY
[FreeTextEntry1] : Mr. Cano presents for follow-up evaluation.  1.  Patient will continue on CPAP therapy for obstructive sleep apnea.  He is also on modafinil for hypersomnolence. 2.  Patient is status post CABG x 1.  He developed post pericardiotomy syndrome and had a thoracentesis as well as a pericardiocentesis at [facility].  He will be sent for a PA and lateral chest x-ray. 3.  Follow-up in 6 months with complete pulmonary function test.

## 2024-03-20 NOTE — HISTORY OF PRESENT ILLNESS
[TextBox_4] : Mr. Cano presents for a follow-up evaluation.  He had single-vessel CABG in 2/24.  He had a LIMA to the LAD.  Minimally invasive surgery was done at [facility].  Patient was subsequently discharged home.  He then went to [facility] with shortness of breath.  He was found to have pleural pericardial effusion.  Thoracentesis was performed which drained 1500 cc of hemorrhagic fluid.  He also had a pericardiocentesis.  He did not have cardiac tamponade.  Currently he is complaining of some throat clearing.  Mr. Cano has no significant shortness of breath.  He continues on CPAP for his obstructive sleep apnea.  He also has hypersomnolence and is on modafinil.

## 2024-03-21 ENCOUNTER — APPOINTMENT (OUTPATIENT)
Dept: RADIOLOGY | Facility: CLINIC | Age: 64
End: 2024-03-21
Payer: MEDICARE

## 2024-03-21 PROCEDURE — 71046 X-RAY EXAM CHEST 2 VIEWS: CPT

## 2024-03-24 ENCOUNTER — NON-APPOINTMENT (OUTPATIENT)
Age: 64
End: 2024-03-24

## 2024-07-08 ENCOUNTER — RX RENEWAL (OUTPATIENT)
Age: 64
End: 2024-07-08

## 2024-09-25 ENCOUNTER — APPOINTMENT (OUTPATIENT)
Dept: INTERNAL MEDICINE | Facility: CLINIC | Age: 64
End: 2024-09-25
Payer: MEDICARE

## 2024-09-25 VITALS
HEART RATE: 65 BPM | RESPIRATION RATE: 20 BRPM | OXYGEN SATURATION: 98 % | WEIGHT: 221 LBS | HEIGHT: 70 IN | TEMPERATURE: 98.2 F | SYSTOLIC BLOOD PRESSURE: 131 MMHG | BODY MASS INDEX: 31.64 KG/M2 | DIASTOLIC BLOOD PRESSURE: 73 MMHG

## 2024-09-25 DIAGNOSIS — I97.0 POSTCARDIOTOMY SYNDROME: ICD-10-CM

## 2024-09-25 DIAGNOSIS — R06.00 DYSPNEA, UNSPECIFIED: ICD-10-CM

## 2024-09-25 DIAGNOSIS — G47.419 NARCOLEPSY W/OUT CATAPLEXY: ICD-10-CM

## 2024-09-25 DIAGNOSIS — G47.33 OBSTRUCTIVE SLEEP APNEA (ADULT) (PEDIATRIC): ICD-10-CM

## 2024-09-25 PROCEDURE — 94060 EVALUATION OF WHEEZING: CPT

## 2024-09-25 PROCEDURE — G2211 COMPLEX E/M VISIT ADD ON: CPT

## 2024-09-25 PROCEDURE — 94727 GAS DIL/WSHOT DETER LNG VOL: CPT

## 2024-09-25 PROCEDURE — 94729 DIFFUSING CAPACITY: CPT

## 2024-09-25 PROCEDURE — ZZZZZ: CPT

## 2024-09-25 PROCEDURE — 99213 OFFICE O/P EST LOW 20 MIN: CPT

## 2024-09-25 NOTE — DISCUSSION/SUMMARY
[FreeTextEntry1] : Mr. Cano presents for follow-up evaluation.  He has a history of obstructive sleep apnea/narcolepsy.  He will continue on modafinil therapy.  Complete pulmonary function tests are within normal limits.  He will follow-up in 6 months.

## 2024-09-25 NOTE — HISTORY OF PRESENT ILLNESS
[TextBox_4] : Mr. Cano presents for follow-up evaluation.  He has a history of obstructive sleep apnea/narcolepsy.  He is on modafinil 300 mg daily.  Patient denies any symptoms of daytime tiredness.  He has a previous history of CABG with resultant post pericardial myotomy syndrome which required thoracentesis and pericardiocentesis.  Currently denies any chest pain, shortness of breath or palpitations.

## 2025-03-24 ENCOUNTER — APPOINTMENT (OUTPATIENT)
Dept: INTERNAL MEDICINE | Facility: CLINIC | Age: 65
End: 2025-03-24
Payer: MEDICARE

## 2025-03-24 VITALS
WEIGHT: 225 LBS | HEART RATE: 64 BPM | TEMPERATURE: 98.1 F | BODY MASS INDEX: 32.21 KG/M2 | HEIGHT: 70 IN | DIASTOLIC BLOOD PRESSURE: 80 MMHG | OXYGEN SATURATION: 97 % | SYSTOLIC BLOOD PRESSURE: 124 MMHG

## 2025-03-24 DIAGNOSIS — G47.33 OBSTRUCTIVE SLEEP APNEA (ADULT) (PEDIATRIC): ICD-10-CM

## 2025-03-24 DIAGNOSIS — Z12.11 ENCOUNTER FOR SCREENING FOR MALIGNANT NEOPLASM OF COLON: ICD-10-CM

## 2025-03-24 DIAGNOSIS — Z98.890 OTHER SPECIFIED POSTPROCEDURAL STATES: ICD-10-CM

## 2025-03-24 DIAGNOSIS — G47.419 NARCOLEPSY W/OUT CATAPLEXY: ICD-10-CM

## 2025-03-24 DIAGNOSIS — E66.9 OBESITY, UNSPECIFIED: ICD-10-CM

## 2025-03-24 DIAGNOSIS — Z87.898 PERSONAL HISTORY OF OTHER SPECIFIED CONDITIONS: ICD-10-CM

## 2025-03-24 PROCEDURE — 99213 OFFICE O/P EST LOW 20 MIN: CPT

## 2025-03-24 RX ORDER — DEXLANSOPRAZOLE 60 MG/1
60 CAPSULE, DELAYED RELEASE ORAL
Refills: 0 | Status: ACTIVE | COMMUNITY

## 2025-03-24 RX ORDER — OMEGA-3/DHA/EPA/FISH OIL 300-1000MG
400 CAPSULE ORAL
Refills: 0 | Status: ACTIVE | COMMUNITY

## 2025-03-24 RX ORDER — METOPROLOL SUCCINATE 25 MG/1
25 TABLET, EXTENDED RELEASE ORAL
Refills: 0 | Status: ACTIVE | COMMUNITY

## 2025-03-24 RX ORDER — ASPIRIN 81 MG
81 TABLET, DELAYED RELEASE (ENTERIC COATED) ORAL
Refills: 0 | Status: ACTIVE | COMMUNITY

## 2025-03-24 RX ORDER — UBIDECARENONE 200 MG
CAPSULE ORAL
Refills: 0 | Status: ACTIVE | COMMUNITY

## 2025-03-24 RX ORDER — ROSUVASTATIN CALCIUM 10 MG/1
10 TABLET, FILM COATED ORAL
Refills: 0 | Status: ACTIVE | COMMUNITY

## 2025-04-22 ENCOUNTER — RX RENEWAL (OUTPATIENT)
Age: 65
End: 2025-04-22